# Patient Record
Sex: FEMALE | Race: WHITE | Employment: OTHER | URBAN - METROPOLITAN AREA
[De-identification: names, ages, dates, MRNs, and addresses within clinical notes are randomized per-mention and may not be internally consistent; named-entity substitution may affect disease eponyms.]

---

## 2023-01-25 ENCOUNTER — HOSPITAL ENCOUNTER (OUTPATIENT)
Dept: PHYSICAL THERAPY | Age: 63
Discharge: HOME | End: 2023-01-25
Payer: COMMERCIAL

## 2023-01-25 PROCEDURE — 97140 MANUAL THERAPY 1/> REGIONS: CPT

## 2023-01-25 PROCEDURE — 97162 PT EVAL MOD COMPLEX 30 MIN: CPT

## 2023-01-25 PROCEDURE — 97110 THERAPEUTIC EXERCISES: CPT

## 2023-01-25 PROCEDURE — 97530 THERAPEUTIC ACTIVITIES: CPT

## 2023-01-25 NOTE — PROGRESS NOTES
PT INITIAL EVALUATION NOTE 2-15    Patient Name: Mac Ziegler \"Agnes Amador\"  Date:2023  : 1960  [x]  Patient  Verified  Payor: Kelsi Andersen / Plan: Tiffany Layton PPO / Product Type: PPO /    In time: 4711  Out time: 9360  Total Treatment Time (min): 79  Visit #: 1     Treatment Area: Groin pain [R10.30]    SUBJECTIVE  Pain Level (0-10 scale): 7 currently in L hip/LE. The patient describes symptoms as \"like somebody stuck a needle in my foot\"; \"dull tingly cool sparkly\". Any medication changes, allergies to medications, adverse drug reactions, diagnosis change, or new procedure performed?: [] No    [x] Yes (see summary sheet for update)  Subjective:       *The patient is R hand dominant*    The patient reports initial insidious onset of L groin pain about 1.5 years ago, which has since radiated into L outer calf/outer knee more recently; she notes she feels she is compensating and symptoms have become more prominent recently. Additionally, she notes onset of L foot pain between her fourth and fifth digits prior to onset of hip pain. She denies any numbness/tingling, however endorses feelings of \"jello-y legs, like they might give out on me\". She denies any falls, and has not had any imaging for this. She notes she can sit and stand for work, however it is difficult for her to switch between positions secondary to work setup at home. Current functional limitations include: descending stairs; sitting > 2 minutes; getting comfortable to sleep (sleeps best on L side with pillow between knees); and getting down on the floor. Goals: \"I want to ride horseback again. Last time I got on a horse, about eight weeks ago, it was so uncomfortable I had to get off of the saddle. \"      OBJECTIVE/EXAMINATION    Posture:  Wide base of support, increased bilateral hip/knee flexion, increased weight bearing through forefoot, increased lumbar lordosis, forward head posture, bilateral shoulder internal rotation  Other Observations:   Squat: Feet shoulder width apart, increased lumbar flexion, braces using L UE on L thigh. Patient noting increased L LE \"irritation\" with repetition. Gait and Functional Mobility: The patient ambulates with decreased vicky, decreased L hip extension during terminal stance, narrow base of support, R trunk lean, increased lateral trunk sway, and bilateral Trendelenberg  Palpation: Tender to palpation at L hip adductors/rectus femoris/posterolateral hip (gluteus gildardo/medius/minimus/piriformis)/QL        Lumbar AROM:          R  L    Flexion    Fingertips to distal tibias (L anterior thigh p!)    Extension   100%      Side Bending   Fingertips 4cm from knee joint line Fingertips 6cm from knee joint line    Rotation   100%  75% (L anterior thigh p!)      Hip ROM (A/PROM)              R                     L  Flexion                                   105/123                   92 (L hip/anterior thigh p!)/103 (L hip/anterior thigh p!)  Extension                               WNL               WNL  Internal Rotation                    36 (R groin \"pulling\")                   10 (L anterior hip p!)  External Rotation                   57                   60    LOWER QUARTER   MUSCLE STRENGTH  KEY       R  L  0 - No Contraction  Knee ext  4  4- (L groin/anterior thigh p!)  1 - Trace            flex  4+  4  2 - Poor   Hip ext   4-/4-  4/4  3 - Fair          flex   4  4 (L groin p!)  4 - Good         abd  4+  4  5 - Normal         add  4 (L groin p!) 4      Ankle DF  4+  4+ (L great toe p!)                PF  See heel raise test below                INV  4+  4                EV  4+  4+    Heel Raise Test: R = 20 repetitions; L = 21 repetitions (L groin p!)    Flexibility: Hamstring 90-90 Test = (+) bilaterally;  Damian Test = (+) bilaterally (iliopsoas)    Mobility Assessment: Noted hypomobility to mid-thoracic PA joint mobilizations (T4-10)      Neurological: Reflexes / Sensations: Dermatomes WNL to light touch throughout bilateral LEs    Special Tests:    Trendelenberg: (+) R > L    FABERS: (-) bilaterally    Slump: MSK response bilaterally                               SLR: Covertly (+) with distal sensitizer (ankle plantar/dorsiflexion) for familiar low back/buttock symptoms              Prone Instability Test: (-)              Repeated Motions: Flexion = (-) (Noted increased R trunk lean with increased lumbar flexion); Extension = (+) for peripheralization into L lateral knee/lower leg              Hip Scour: (+) on L    Functional Tests:  Single Limb Balance: R >= 30 seconds; L = 29.1 seconds (Noted increased L LE pronation, co-contraction). Eyes closed R = 1.8 seconds. 15 min Therapeutic Exercise:  [x] See flow sheet :   Rationale: increase ROM, increase strength, improve coordination, improve balance, and increase proprioception to improve the patients ability to perform transitional movements, navigate stairs, and sit for prolonged periods    15 min Therapeutic Activity:  []  See flow sheet : Includes time spent on pain neuroscience education, to include education regarding pain as body's \"alarm system\", and role of pain in informing body of surroundings. Emphasized importance of graded activity to tolerance within pain threshold versus pain free guidelines. Patient verbalizing and demonstrating understanding of provided education with 100% accuracy using teach back method.    Rationale: increase ROM, increase strength, improve coordination, improve balance, and increase proprioception  to improve the patients ability to perform transitional movements, navigate stairs, and sit for prolonged periods     10 min Manual Therapy: Bilateral hip PROM, all planes to tolerance; sidelying bilateral hip extension (PA) joint mobilizations (Grade III); STM/TPR L hip flexors/QL/posterolateral hip/proximal hip adductors (gluteus gildardo/medius/minimus/piriformis); hooklying L hip flexor release   Rationale: decrease pain, increase ROM, increase tissue extensibility, decrease trigger points, and increase postural awareness  to improve the patients ability to perform transitional movements, navigate stairs, and sit for prolonged periods           With   [x] TE   [x] TA   [] Neuro   [] SC   [] other: Patient Education: [x] Review HEP    [] Progressed/Changed HEP based on:   [] positioning   [] body mechanics   [] transfers   [] heat/ice application    [] other:      Other Objective/Functional Measures: FOTO Functional Measure: 49/100                Pain Level (0-10 scale) post treatment: 3 in L anterior hip, patient demonstrating improved L LE stance time and L hip extension during terminal stance of gait with ambulation post-evaluation and treatment today    ASSESSMENT:      [x]  See Plan of [id number] Herrera, PT, DPT 1/25/2023

## 2023-01-25 NOTE — THERAPY EVALUATION
[address] Physical Therapy  [address] [facility] [address] [address] [place]  Phone: [phone] Fax: [phone]    Plan of Care/Statement of Necessity for Physical Therapy Services  2-15    Patient name: Awilda Wahl  : 1960  Provider#: [id number]  Referral source: CORNELIO Uribe      Medical/Treatment Diagnosis: Groin pain [R10.30]     Prior Hospitalization: see medical history     Comorbidities: Visual impairment, history of syncope, hypercholesterolemia, chronic pain  Prior Level of Function: Independent, moderately active  Medications: Verified on Patient Summary List    Start of Care: 23      Onset Date: Chronic with recent exacerbation       The Plan of Care and following information is based on the information from the initial evaluation. Assessment/ key information:     The patient is a 58year old female who presents to physical therapy services due to chronic and worsening L anterior hip/knee/lower leg pain. She demonstrates signs and symptoms consistent with mobility, muscle power, and movement coordination impairments secondary to hip intraarticular pathology, including (+) repeated motions (flexion bias)/L hip scour test, decreased L > R LE muscle length, decreased and painful multiplanar lumbar/L hip A/PROM, decreased multiplanar bilateral LE strength (heel raise test: R = 20 repetitions; L = 21 repetitions), decreased single limb postural control (R = 30 seconds eyes open/1.8 seconds with eyes closed; L = 29.1 seconds), and aberrant movement patterns with squatting, transfers, and ambulation. Patient noting significant decrease in familiar symptoms, demonstrating improvement in gait mechanics with increased L LE stance time, improved L hip extension during terminal stance post-evaluation and treatment.  The patient would benefit from continued skilled physical therapy services to improve symptom management and safety/endurance/independence with functional tasks such as crossing L leg over R, stair navigation, and sitting for prolonged periods. Lumbar AROM:                                                                                               R                      L                        Flexion                                     Fingertips to distal tibias (L anterior thigh p!)               Extension                                100%                                         Side Bending                           Fingertips 4cm from knee joint line     Fingertips 6cm from knee joint line                        Rotation                                   100%               75% (L anterior thigh p!)                          Hip ROM (A/PROM)              R                     L  Flexion                                   105/123                   92 (L hip/anterior thigh p!)/103 (L hip/anterior thigh p!)  Extension                               WNL               WNL  Internal Rotation                    36 (R groin \"pulling\")                   10 (L anterior hip p!)  External Rotation                   57                   60    Evaluation Complexity History HIGH Complexity :3+ comorbidities / personal factors will impact the outcome/ POC ; Examination HIGH Complexity : 4+ Standardized tests and measures addressing body structure, function, activity limitation and / or participation in recreation  ;Presentation MEDIUM Complexity : Evolving with changing characteristics  ; Clinical Decision Making MEDIUM Complexity : FOTO score of 26-74  Overall Complexity Rating: MEDIUM    Problem List: pain affecting function, decrease ROM, decrease strength, impaired gait/ balance, decrease ADL/ functional abilitiies, decrease activity tolerance, decrease flexibility/ joint mobility, and decrease transfer abilities   Treatment Plan may include any combination of the following: Therapeutic exercise, Neuromuscular reeducation, Manual therapy, Therapeutic activity, Self care/home management, Electric stim unattended , Vasopneumatic device, Gait training, Ultrasound, Mechanical traction, Electric stim attended, Needle insertion w/o injection (1 or 2 muscles), and Needle insertion w/o injection (3+ muscles)  Patient / Family readiness to learn indicated by: asking questions, trying to perform skills, and interest  Persons(s) to be included in education: patient (P)  Barriers to Learning/Limitations: None  Patient Goal (s): I want to ride horseback again. Last time I got on a horse, about eight weeks ago, it was so uncomfortable I had to get off of the saddle.   Patient Self Reported Health Status: good  Rehabilitation Potential: good    Short Term Goals: To be accomplished in 6-8 treatments: The patient will demonstrate understanding of and compliance with updated and progressive HEP toward improved participation in physical therapy plan of care. The patient will demonstrate L hip IR PROM >= 25 degrees toward improved mechanics with crossing L LE over R, transferring to/from floor, and stair navigation. The patient will demonstrate ability to maintain L single limb postural control >= 30 seconds toward improved stability with stair navigation. Long Term Goals: To be accomplished in 12-16 treatments: The patient will demonstrate multiplanar bilateral LE strength increased by >= 1/2 MMT grade toward improved endurance with functional activities such as crossing L LE over R and stair navigation. The patient will demonstrate ability to complete >= 25 single limb heel raises bilaterally toward improved functional endurance with stair navigation. The patient will score >= 63 on hip FOTO to demonstrate significantly improved subjective report of function.    Frequency / Duration: Patient to be seen 2 times per week for 12-16 treatments. Patient/ Caregiver education and instruction: self care, activity modification, and exercises    [x]  Plan of care has been reviewed with GEGE Jimenez PT, DPT 1/25/2023     ________________________________________________________________________    I certify that the above Therapy Services are being furnished while the patient is under my care. I agree with the treatment plan and certify that this therapy is necessary.     Physician's Signature:____________________  Date:____________Time: _________      CORNELIO Mccarthy

## 2023-02-02 ENCOUNTER — APPOINTMENT (OUTPATIENT)
Dept: PHYSICAL THERAPY | Age: 63
End: 2023-02-02
Payer: COMMERCIAL

## 2023-02-09 ENCOUNTER — APPOINTMENT (OUTPATIENT)
Dept: PHYSICAL THERAPY | Age: 63
End: 2023-02-09
Payer: COMMERCIAL

## 2023-02-13 ENCOUNTER — HOSPITAL ENCOUNTER (OUTPATIENT)
Dept: PHYSICAL THERAPY | Age: 63
Discharge: HOME | End: 2023-02-13
Payer: COMMERCIAL

## 2023-02-13 PROCEDURE — 97110 THERAPEUTIC EXERCISES: CPT

## 2023-02-13 PROCEDURE — 97140 MANUAL THERAPY 1/> REGIONS: CPT

## 2023-02-13 NOTE — PROGRESS NOTES
PT DAILY TREATMENT NOTE 2-15    Patient Name: Sonia Whitfield  Date:2023  : 1960  [x]  Patient  Verified  Payor: Joe Willingham / Plan: Taniya Rodriguez PPO / Product Type: PPO /    In time: 6:05 PM  Out time: 7:05 PM  Total Treatment Time (min): 60 minutes   Visit #:  2    Treatment Area: Groin pain [R10.30]    SUBJECTIVE  Pain Level (0-10 scale): 6-7/10 L hip/groin   Any medication changes, allergies to medications, adverse drug reactions, diagnosis change, or new procedure performed?: [x] No    [] Yes (see summary sheet for update)  Subjective functional status/changes:   [] No changes reported    Patient reports her pain has been up and down since initial evaluation, reports she was having a good day at her initial evaluation so feels PT did not get proper representation of her pain. Patient reports she has been performing HEP 1x/day since eval, however last night was experiencing inc in hip pain and was unable to complete all exercises. Patient reports she has been able to ride her horse again. Notes no difficulty once sitting in saddle/riding horse, but has pain in bilateral hips, L > R, when mounting the horse. OBJECTIVE    50 min Therapeutic Exercise:  [x] See flow sheet :   Rationale: increase ROM, increase strength, improve coordination, improve balance, and increase proprioception to improve the patients ability to perform functional daily tasks with minimal to no pain. 10 min Manual Therapy:  left hip PROM, all planes to tolerance; sidelying left hip extension (PA) joint mobilizations (Grade III); STM/TPR L hip flexors/QL/proximal hip adductors; hooklying L hip flexor release    Rationale: decrease pain, increase ROM, increase tissue extensibility, and decrease trigger points  to improve the patients ability to perform functional daily tasks with minimal to no pain.            With   [x] TE   [] TA   [] Neuro   [] SC   [] other: Patient Education: [x] Review HEP    [] Progressed/Changed HEP based on:   [] positioning   [] body mechanics   [] transfers   [] heat/ice application    [] other:      Other Objective/Functional Measures:      Pain Level (0-10 scale) post treatment: 6-7/10; subjective report of \"much looser\"     ASSESSMENT/Changes in Function:   Patient tolerates treatment session well today, noting appropriate response to all exercises and progressions. Reviewed initial HEP with patient that was prescribed at evaluation, providing verbal and tactile cues throughout for proper technique and muscle engagement. Introduced quad series exercises of hip hikes and fire hydrants. Patient demonstrates mod difficulty maintaining level pelvis and neutral spine position during quadruped exercises due to core/trunk weakness. Patient also demonstrates inc difficulty performing hip hikes on LLE > RLE due to muscular weakness. Patient pain responds favorably to manual intervention and therapeutic exercises, noting subjective report of \"feeling much looser\" and \"having more movement\" post-tx today. Patient will continue to benefit from skilled PT services to modify and progress therapeutic interventions, address functional mobility deficits, address ROM deficits, address strength deficits, analyze and address soft tissue restrictions, analyze and cue movement patterns, and analyze and modify body mechanics/ergonomics to attain remaining goals. [x]  See Plan of Care  []  See progress note/recertification  []  See Discharge Summary         Progress towards goals / Updated goals:  Short Term Goals: To be accomplished in 6-8 treatments: The patient will demonstrate understanding of and compliance with updated and progressive HEP toward improved participation in physical therapy plan of care. The patient will demonstrate L hip IR PROM >= 25 degrees toward improved mechanics with crossing L LE over R, transferring to/from floor, and stair navigation.               The patient will demonstrate ability to maintain L single limb postural control >= 30 seconds toward improved stability with stair navigation. Long Term Goals: To be accomplished in 12-16 treatments: The patient will demonstrate multiplanar bilateral LE strength increased by >= 1/2 MMT grade toward improved endurance with functional activities such as crossing L LE over R and stair navigation. The patient will demonstrate ability to complete >= 25 single limb heel raises bilaterally toward improved functional endurance with stair navigation. The patient will score >= 63 on hip FOTO to demonstrate significantly improved subjective report of function.      PLAN  [x]  Upgrade activities as tolerated     [x]  Continue plan of care  [x]  Update interventions per flow sheet       []  Discharge due to:_  []  Other:_      Steven, PT 2/13/2023

## 2023-02-16 ENCOUNTER — HOSPITAL ENCOUNTER (OUTPATIENT)
Dept: PHYSICAL THERAPY | Age: 63
Discharge: HOME | End: 2023-02-16
Payer: COMMERCIAL

## 2023-02-16 PROCEDURE — 97110 THERAPEUTIC EXERCISES: CPT

## 2023-02-16 PROCEDURE — 97140 MANUAL THERAPY 1/> REGIONS: CPT

## 2023-02-16 NOTE — PROGRESS NOTES
PT DAILY TREATMENT NOTE 2-15    Patient Name: Bam Ovalle  Date:2023  : 1960  [x]  Patient  Verified  Payor: Ferdinand Adams / Plan: [employer] PPO / Product Type: PPO /    In time: 242 PM  Out time: 340 PM  Total Treatment Time (min): 58 minutes   Visit #:  3    Treatment Area: Groin pain [R10.30]    SUBJECTIVE  Pain Level (0-10 scale): 6-7/10 L hip/groin   Any medication changes, allergies to medications, adverse drug reactions, diagnosis change, or new procedure performed?: [x] No    [] Yes (see summary sheet for update)  Subjective functional status/changes:   [] No changes reported  Patient noted they felt \"really good,\" following previous treatment session, noted they had not felt that good in \"years,\". Patient noted they went riding on Tuesday and took an advil before and 2 after and noted they felt pretty good going into Wednesday. Noted they tried to go on a 30 minute walk and \"tough it out,\" but noted pain was excruciating during and following walk. Patient noted due to their increased pain on the walk they were not able to get through all their exercises as often as they would have liked to. OBJECTIVE    46 min Therapeutic Exercise:  [x] See flow sheet :   Rationale: increase ROM, increase strength, improve coordination, improve balance, and increase proprioception to improve the patients ability to perform functional daily tasks with minimal to no pain. 12 min Manual Therapy:  left hip PROM, all planes to tolerance; sidelying left hip extension (PA) joint mobilizations (Grade III); STM/TPR L hip flexors/QL/proximal hip adductors; hooklying L hip flexor release    Rationale: decrease pain, increase ROM, increase tissue extensibility, and decrease trigger points  to improve the patients ability to perform functional daily tasks with minimal to no pain.            With   [x] TE   [] TA   [] Neuro   [] SC   [] other: Patient Education: [x] Review HEP    [] Progressed/Changed HEP based on:   [] positioning   [] body mechanics   [] transfers   [] heat/ice application    [] other:      Other Objective/Functional Measures:      Pain Level (0-10 scale) post treatment: 3.5/10    ASSESSMENT/Changes in Function:   Patient tolerated treatment session well today, able to perform exercises and progressions while decreasing symptoms post treatment session. Patient continued to respond favorably with PROM during manual therapy, noted decrease in painful response with increased repetitions. Patient continues to need increased amount of verbal and visual cuing during quad series today. Continues to note rapid fatigue of L>R gluteal musculature during quad series and clamshells. Continue to progress as tolerated. Patient will continue to benefit from skilled PT services to modify and progress therapeutic interventions, address functional mobility deficits, address ROM deficits, address strength deficits, analyze and address soft tissue restrictions, analyze and cue movement patterns, and analyze and modify body mechanics/ergonomics to attain remaining goals. [x]  See Plan of Care  []  See progress note/recertification  []  See Discharge Summary         Progress towards goals / Updated goals:  Short Term Goals: To be accomplished in 6-8 treatments: The patient will demonstrate understanding of and compliance with updated and progressive HEP toward improved participation in physical therapy plan of care. The patient will demonstrate L hip IR PROM >= 25 degrees toward improved mechanics with crossing L LE over R, transferring to/from floor, and stair navigation. The patient will demonstrate ability to maintain L single limb postural control >= 30 seconds toward improved stability with stair navigation. Long Term Goals: To be accomplished in 12-16 treatments:                The patient will demonstrate multiplanar bilateral LE strength increased by >= 1/2 MMT grade toward improved endurance with functional activities such as crossing L LE over R and stair navigation. The patient will demonstrate ability to complete >= 25 single limb heel raises bilaterally toward improved functional endurance with stair navigation. The patient will score >= 63 on hip FOTO to demonstrate significantly improved subjective report of function.      PLAN  [x]  Upgrade activities as tolerated     [x]  Continue plan of care  [x]  Update interventions per flow sheet       []  Discharge due to:_  []  Other:_      Gina Valles, PTA 2/16/2023

## 2023-02-20 ENCOUNTER — HOSPITAL ENCOUNTER (OUTPATIENT)
Dept: PHYSICAL THERAPY | Age: 63
Discharge: HOME | End: 2023-02-20
Payer: COMMERCIAL

## 2023-02-20 PROCEDURE — 97140 MANUAL THERAPY 1/> REGIONS: CPT

## 2023-02-20 PROCEDURE — 97110 THERAPEUTIC EXERCISES: CPT

## 2023-02-20 NOTE — PROGRESS NOTES
PT DAILY TREATMENT NOTE 2-15    Patient Name: Thurlow Baumgarten  QDRK:  : 1960  [x]  Patient  Verified  Payor: Curt Walsh / Plan: Rubén Morse PPO / Product Type: PPO /    In time: 6:00 PM  Out time: 7:07  PM  Total Treatment Time (min): 67  minutes   Visit #:  4    Treatment Area: Groin pain [R10.30]    SUBJECTIVE  Pain Level (0-10 scale): 6 /10 L hip/groin   Any medication changes, allergies to medications, adverse drug reactions, diagnosis change, or new procedure performed?: [x] No    [] Yes (see summary sheet for update)  Subjective functional status/changes:   [] No changes reported    Patient reports pain has remained similar since previous therapy session. Reports she has not done any more horseback riding since previous session. Patient notes she went for walk on Saturday for 30 minutes and walked at slower pace. Notes improving tolerance to walking when walking at slower pace. Patient reports she is constantly fearful of doing further damage to herself. OBJECTIVE           Modality rationale: decrease pain and increase tissue extensibility to improve the patients ability to perform functional daily tasks with minimal to no pain.      Min Type Additional Details     [] Estim: []Att   []Unatt        []TENS instruct                  []IFC  []Premod   []NMES                     []Other:  []w/US   []w/ice   []w/heat  Position:  Location:     []  Traction: [] Cervical       []Lumbar                       [] Prone          []Supine                       []Intermittent   []Continuous Lbs:  [] before manual  [] after manual  []w/heat     []  Ultrasound: []Continuous   [] Pulsed at:                           []1MHz   []3MHz Location:  W/cm2:     [] Paraffin         Location:   []w/heat    10 [x]  Ice     []  Heat  []  Ice massage Position: supine  Location: L anterior hip     []  Laser  []  Other: Position:  Location:        []  Vasopneumatic Device Pressure:       [] lo [] med [] hi   Temperature: [x] Skin assessment post-treatment:  [x]intact []redness- no adverse reaction    []redness - adverse reaction:      45  min Therapeutic Exercise:  [x] See flow sheet :   Rationale: increase ROM, increase strength, improve coordination, improve balance, and increase proprioception to improve the patients ability to perform functional daily tasks with minimal to no pain. 12  min Manual Therapy:  left hip PROM, all planes to tolerance; sidelying left hip extension (PA) joint mobilizations (Grade III); STM/TPR L hip flexors/QL/proximal hip adductors; hooklying L hip flexor release    Rationale: decrease pain, increase ROM, increase tissue extensibility, and decrease trigger points  to improve the patients ability to perform functional daily tasks with minimal to no pain. With   [x] TE   [] TA   [] Neuro   [] SC   [] other: Patient Education: [x] Review HEP    [] Progressed/Changed HEP based on:   [] positioning   [] body mechanics   [] transfers   [] heat/ice application    [] other:      Other Objective/Functional Measures:      Pain Level (0-10 scale) post treatment: 5/10    ASSESSMENT/Changes in Function:   Patient tolerated treatment session well today, able to perform exercises and progressions while decreasing symptoms post treatment session. Patient continues to require frequent reassurance she is not causing any further damage to MSK system while performing exercises. Modified hip flexor stretch with alternate stretch position. Utilized half kneeling position to stretch hip flexor musculature and patient notes improving tolerance compared to stretch off edge of bed. Progressed patient with weightbearing exercises and patient tolerates well. Provided verbal cues for proper hip hinge and LE alignment during sit to stand exercise to improve patients body mechanics and tolerance to bending/squatting activities. Continue to progress per patient tolerance.  Patient will continue to benefit from skilled PT services to modify and progress therapeutic interventions, address functional mobility deficits, address ROM deficits, address strength deficits, analyze and address soft tissue restrictions, analyze and cue movement patterns, and analyze and modify body mechanics/ergonomics to attain remaining goals. [x]  See Plan of Care  []  See progress note/recertification  []  See Discharge Summary         Progress towards goals / Updated goals:  Short Term Goals: To be accomplished in 6-8 treatments: The patient will demonstrate understanding of and compliance with updated and progressive HEP toward improved participation in physical therapy plan of care. The patient will demonstrate L hip IR PROM >= 25 degrees toward improved mechanics with crossing L LE over R, transferring to/from floor, and stair navigation. The patient will demonstrate ability to maintain L single limb postural control >= 30 seconds toward improved stability with stair navigation. Long Term Goals: To be accomplished in 12-16 treatments: The patient will demonstrate multiplanar bilateral LE strength increased by >= 1/2 MMT grade toward improved endurance with functional activities such as crossing L LE over R and stair navigation. The patient will demonstrate ability to complete >= 25 single limb heel raises bilaterally toward improved functional endurance with stair navigation. The patient will score >= 63 on hip FOTO to demonstrate significantly improved subjective report of function.      PLAN  [x]  Upgrade activities as tolerated     [x]  Continue plan of care  [x]  Update interventions per flow sheet       []  Discharge due to:_  []  Other:_      Steven, PT 2/20/2023

## 2023-02-22 ENCOUNTER — HOSPITAL ENCOUNTER (OUTPATIENT)
Dept: PHYSICAL THERAPY | Age: 63
Discharge: HOME | End: 2023-02-22
Payer: COMMERCIAL

## 2023-02-22 PROCEDURE — 97110 THERAPEUTIC EXERCISES: CPT

## 2023-02-22 PROCEDURE — 97140 MANUAL THERAPY 1/> REGIONS: CPT

## 2023-02-22 NOTE — PROGRESS NOTES
PT DAILY TREATMENT NOTE 2-15    Patient Name: Randy Foster  KFXX:  : 1960  [x]  Patient  Verified  Payor: Sharan Little / Plan: Vara Dusty PPO / Product Type: PPO /    In time: 6:07 PM  Out time: 7:05  PM  Total Treatment Time (min): 58  minutes   Visit #:  5    Treatment Area: Groin pain [R10.30]    SUBJECTIVE  Pain Level (0-10 scale): 5/10 L hip/groin   Any medication changes, allergies to medications, adverse drug reactions, diagnosis change, or new procedure performed?: [x] No    [] Yes (see summary sheet for update)  Subjective functional status/changes:   [] No changes reported    Patient noted they were feeling \"much worse this morning,\" but improved, noted feeling a lot better at time of appointment. Noted soreness after previous treatment session. Also noted they continue to work on HEP and have noted improvement with symptoms since initial treatment session. OBJECTIVE           Modality rationale: decrease pain and increase tissue extensibility to improve the patients ability to perform functional daily tasks with minimal to no pain.      Min Type Additional Details     [] Estim: []Att   []Unatt        []TENS instruct                  []IFC  []Premod   []NMES                     []Other:  []w/US   []w/ice   []w/heat  Position:  Location:     []  Traction: [] Cervical       []Lumbar                       [] Prone          []Supine                       []Intermittent   []Continuous Lbs:  [] before manual  [] after manual  []w/heat     []  Ultrasound: []Continuous   [] Pulsed at:                           []1MHz   []3MHz Location:  W/cm2:     [] Paraffin         Location:   []w/heat    NT [x]  Ice     []  Heat  []  Ice massage Position: supine  Location: L anterior hip     []  Laser  []  Other: Position:  Location:        []  Vasopneumatic Device Pressure:       [] lo [] med [] hi   Temperature:       [x] Skin assessment post-treatment:  [x]intact []redness- no adverse reaction []redness - adverse reaction:      46 min Therapeutic Exercise:  [x] See flow sheet :   Rationale: increase ROM, increase strength, improve coordination, improve balance, and increase proprioception to improve the patients ability to perform functional daily tasks with minimal to no pain. 12  min Manual Therapy:  left hip PROM, all planes to tolerance; sidelying left hip extension (PA) joint mobilizations (Grade III); STM/TPR L hip flexors/QL/proximal hip adductors; L belted hip mobilizations (lateral distraction + hip flexion/IR/ER PROM)   Rationale: decrease pain, increase ROM, increase tissue extensibility, and decrease trigger points  to improve the patients ability to perform functional daily tasks with minimal to no pain. With   [x] TE   [] TA   [] Neuro   [] SC   [] other: Patient Education: [x] Review HEP    [x] Progressed/Changed HEP based on:   [x] positioning   [x] body mechanics   [x] transfers   [] heat/ice application    [] other:      Other Objective/Functional Measures:      Pain Level (0-10 scale) post treatment: \"better than when I came in\"    ASSESSMENT/Changes in Function:   Patient tolerated treatment session well today, able to perform exercises and progressions while decreasing pain symptoms post treatment session. Patient continues to note irritation with initial repetitions of PROM during manual therapy, noted positive response to lateral distraction with mob belt today, able to improve PROM in all planes following introduction of belt mobilizations. Patient needed verbal cuing to perform proper hip hinge movement during sit to stands, noted improvement following cuing. Continue to progress as tolerated.   Patient will continue to benefit from skilled PT services to modify and progress therapeutic interventions, address functional mobility deficits, address ROM deficits, address strength deficits, analyze and address soft tissue restrictions, analyze and cue movement patterns, and analyze and modify body mechanics/ergonomics to attain remaining goals. [x]  See Plan of Care  []  See progress note/recertification  []  See Discharge Summary         Progress towards goals / Updated goals:  Short Term Goals: To be accomplished in 6-8 treatments: The patient will demonstrate understanding of and compliance with updated and progressive HEP toward improved participation in physical therapy plan of care. The patient will demonstrate L hip IR PROM >= 25 degrees toward improved mechanics with crossing L LE over R, transferring to/from floor, and stair navigation. The patient will demonstrate ability to maintain L single limb postural control >= 30 seconds toward improved stability with stair navigation. Long Term Goals: To be accomplished in 12-16 treatments: The patient will demonstrate multiplanar bilateral LE strength increased by >= 1/2 MMT grade toward improved endurance with functional activities such as crossing L LE over R and stair navigation. The patient will demonstrate ability to complete >= 25 single limb heel raises bilaterally toward improved functional endurance with stair navigation. The patient will score >= 63 on hip FOTO to demonstrate significantly improved subjective report of function.      PLAN  [x]  Upgrade activities as tolerated     [x]  Continue plan of care  [x]  Update interventions per flow sheet       []  Discharge due to:_  []  Other:_      Sina Negrete, PTA 2/22/2023

## 2023-02-27 ENCOUNTER — APPOINTMENT (OUTPATIENT)
Dept: PHYSICAL THERAPY | Age: 63
End: 2023-02-27
Payer: COMMERCIAL

## 2023-03-06 ENCOUNTER — APPOINTMENT (OUTPATIENT)
Dept: PHYSICAL THERAPY | Age: 63
End: 2023-03-06

## 2023-04-21 DIAGNOSIS — Z12.31 VISIT FOR SCREENING MAMMOGRAM: Primary | ICD-10-CM

## 2025-06-30 ENCOUNTER — TRANSCRIBE ORDERS (OUTPATIENT)
Facility: HOSPITAL | Age: 65
End: 2025-06-30

## 2025-06-30 DIAGNOSIS — Z12.31 ENCOUNTER FOR SCREENING MAMMOGRAM FOR MALIGNANT NEOPLASM OF BREAST: ICD-10-CM

## 2025-06-30 DIAGNOSIS — R14.0 ABDOMINAL DISTENSION (GASEOUS): Primary | ICD-10-CM
